# Patient Record
Sex: MALE | Race: WHITE | NOT HISPANIC OR LATINO | URBAN - NONMETROPOLITAN AREA
[De-identification: names, ages, dates, MRNs, and addresses within clinical notes are randomized per-mention and may not be internally consistent; named-entity substitution may affect disease eponyms.]

---

## 2017-08-07 PROBLEM — H52.4: Noted: 2017-08-07

## 2017-08-07 PROBLEM — H52.03: Noted: 2017-08-07

## 2017-08-07 PROBLEM — H52.223: Noted: 2017-08-07

## 2020-07-13 ENCOUNTER — IMPORTED ENCOUNTER (OUTPATIENT)
Dept: URBAN - NONMETROPOLITAN AREA CLINIC 1 | Facility: CLINIC | Age: 60
End: 2020-07-13

## 2020-07-13 PROCEDURE — 92015 DETERMINE REFRACTIVE STATE: CPT

## 2020-07-13 PROCEDURE — 92014 COMPRE OPH EXAM EST PT 1/>: CPT

## 2020-07-13 NOTE — PATIENT DISCUSSION
Hyperopia-Discussed diagnosis with patient. Astigmatism-Discussed diagnosis with patient. Presbyopia-Discussed diagnosis with patient. Updated spec Rx given. 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

## 2022-04-09 ASSESSMENT — VISUAL ACUITY
OD_SC: 20/30-2
OS_SC: 20/30+

## 2022-04-09 ASSESSMENT — TONOMETRY
OS_IOP_MMHG: 22
OD_IOP_MMHG: 21

## 2023-04-06 ENCOUNTER — ESTABLISHED PATIENT (OUTPATIENT)
Dept: RURAL CLINIC 1 | Facility: CLINIC | Age: 63
End: 2023-04-06

## 2023-04-06 DIAGNOSIS — H52.223: ICD-10-CM

## 2023-04-06 DIAGNOSIS — H52.03: ICD-10-CM

## 2023-04-06 DIAGNOSIS — H25.13: ICD-10-CM

## 2023-04-06 DIAGNOSIS — H52.4: ICD-10-CM

## 2023-04-06 PROCEDURE — 92015 DETERMINE REFRACTIVE STATE: CPT

## 2023-04-06 PROCEDURE — 92014 COMPRE OPH EXAM EST PT 1/>: CPT

## 2023-04-06 ASSESSMENT — VISUAL ACUITY
OS_CC: 20/30
OU_CC: 20/20
OS_CC: 20/30
OD_CC: 20/30
OD_CC: 20/20
OU_CC: 20/25

## 2023-04-06 ASSESSMENT — TONOMETRY
OD_IOP_MMHG: 15
OS_IOP_MMHG: 16

## 2024-03-26 NOTE — PATIENT DISCUSSION
"""Educated patient on today's findings. Finalized RX given today.  RTC if any changes in vision.""" no